# Patient Record
Sex: MALE | Race: WHITE | NOT HISPANIC OR LATINO
[De-identification: names, ages, dates, MRNs, and addresses within clinical notes are randomized per-mention and may not be internally consistent; named-entity substitution may affect disease eponyms.]

---

## 2024-04-05 PROBLEM — Z00.00 ENCOUNTER FOR PREVENTIVE HEALTH EXAMINATION: Status: ACTIVE | Noted: 2024-04-05

## 2024-04-08 ENCOUNTER — NON-APPOINTMENT (OUTPATIENT)
Age: 19
End: 2024-04-08

## 2024-04-08 ENCOUNTER — APPOINTMENT (OUTPATIENT)
Dept: OTOLARYNGOLOGY | Facility: CLINIC | Age: 19
End: 2024-04-08
Payer: COMMERCIAL

## 2024-04-08 VITALS
SYSTOLIC BLOOD PRESSURE: 132 MMHG | DIASTOLIC BLOOD PRESSURE: 78 MMHG | WEIGHT: 200 LBS | HEIGHT: 68 IN | BODY MASS INDEX: 30.31 KG/M2 | HEART RATE: 86 BPM

## 2024-04-08 DIAGNOSIS — Z78.9 OTHER SPECIFIED HEALTH STATUS: ICD-10-CM

## 2024-04-08 DIAGNOSIS — J35.1 HYPERTROPHY OF TONSILS: ICD-10-CM

## 2024-04-08 DIAGNOSIS — Z82.49 FAMILY HISTORY OF ISCHEMIC HEART DISEASE AND OTHER DISEASES OF THE CIRCULATORY SYSTEM: ICD-10-CM

## 2024-04-08 PROCEDURE — 31231 NASAL ENDOSCOPY DX: CPT

## 2024-04-08 PROCEDURE — 99204 OFFICE O/P NEW MOD 45 MIN: CPT | Mod: 25

## 2024-04-08 NOTE — REVIEW OF SYSTEMS
[Seasonal Allergies] : seasonal allergies [Dizziness] : dizziness [Vertigo] : vertigo [Recurrent Ear Infections] : recurrent ear infections [Recurrent Sinus Infections] : recurrent sinus infections [Sinus Pressure] : sinus pressure [Throat Dryness] : throat dryness [Eye Pain] : eye pain [Eyes Itch] : itching of the eyes [Negative] : Heme/Lymph [FreeTextEntry3] : watery n [FreeTextEntry1] : fatigue

## 2024-04-08 NOTE — ASSESSMENT
[FreeTextEntry1] :  Reviewed and reconciled medications, allergies, PMHx, PSHx, SocHx, FMHx.  Patient presents today stating that for the past couple months he has had repetitive ear infections and sinus infections. He states that the problem was resolved with a strong antibiotic. He states that his ears feel a little clogged.  Physical exam: -right TM: retracted -left TM: retracted -no lateralization to tuning forks -deviated septum left -mildly inflamed turbinates bilaterally L>R -class 4 tonsils -pupils equal and reactive  ENT Procedure:  Flexible nasal endoscopy -right middle meatus clear -right middle turbinate normal -some adenoid tissue -left middle and inferior turbinate swollen -deviated septum left -middle meatus narrow L>R  Audio: -denied today  Plan: -Consider a hearing test -Ordered CT scan of the sinuses for potential surgical planning -FU with results from CT scan

## 2024-04-08 NOTE — HISTORY OF PRESENT ILLNESS
[de-identified] : Patient presents today stating that for the past couple months he has had repetitive ear infections and sinus infections. He states that the problem was resolved with a strong antibiotic. He states that his ears feel a little clogged.

## 2024-04-08 NOTE — PROCEDURE
[Recalcitrant Symptoms] : recalcitrant symptoms  [FreeTextEntry6] :  Procedure: Flexible Nasal Endoscopy: Risks, benefits, and alternatives of flexible endoscopy were explained to the patient. The patient gave oral consent to proceed. The flexible scope was inserted into the right nasal cavity. Endoscopy of the inferior and middle meatus was performed. No polyp, mass, or lesion was appreciated. Olfactory cleft was clear. Spheno-ethmoid recess is clear. Nasopharynx was clear. Turbinates were without mass. The procedure was repeated on the contralateral side with similar findings. -right middle meatus clear -right middle turbinate normal -some adenoid tissue -left middle and inferior turbinate swollen -deviated septum left -middle meatus narrow L>R

## 2024-04-08 NOTE — ADDENDUM
[FreeTextEntry1] :  Documented by Kishor Manzo acting as scribe for Dr. Segovia on 04/08/2024. All Medical record entries made by the Scribe were at my, Dr. Segovia, direction and personally dictated by me on 04/08/2024 . I have reviewed the chart and agree that the record accurately reflects my personal performance of the history, physical exam, assessment and plan. I have also personally directed, reviewed, and agreed with the discharge instructions.

## 2024-04-08 NOTE — CONSULT LETTER
[Dear  ___] : Dear  [unfilled], [Consult Letter:] : I had the pleasure of evaluating your patient, [unfilled]. [Please see my note below.] : Please see my note below. [Consult Closing:] : Thank you very much for allowing me to participate in the care of this patient.  If you have any questions, please do not hesitate to contact me. [Sincerely,] : Sincerely, [FreeTextEntry3] :  Chavo Segovia MD FACS

## 2024-04-08 NOTE — PHYSICAL EXAM
[Hearing Hernandez Test (Tuning Fork On Forehead)] : no lateralization of tone [] : septum deviated to the left [Midline] : trachea located in midline position [Normal] : orientation to person, place, and time: normal [Hearing Loss Right Only] : normal [Hearing Loss Left Only] : normal [de-identified] : retracted [de-identified] : retracted [de-identified] :  mildly inflamed turbinates bilaterally L>R [de-identified] : class 4 [FreeTextEntry2] : profusely tender over the frontal, ehtmoid, and sphenoid sinuses [de-identified] : pupils equal and reactive

## 2024-04-25 ENCOUNTER — NON-APPOINTMENT (OUTPATIENT)
Age: 19
End: 2024-04-25

## 2024-04-26 ENCOUNTER — APPOINTMENT (OUTPATIENT)
Dept: OTOLARYNGOLOGY | Facility: CLINIC | Age: 19
End: 2024-04-26
Payer: COMMERCIAL

## 2024-04-26 VITALS
HEIGHT: 68 IN | SYSTOLIC BLOOD PRESSURE: 116 MMHG | HEART RATE: 70 BPM | DIASTOLIC BLOOD PRESSURE: 79 MMHG | WEIGHT: 200 LBS | BODY MASS INDEX: 30.31 KG/M2

## 2024-04-26 DIAGNOSIS — H66.006 ACUTE SUPPURATIVE OTITIS MEDIA W/OUT SPONTANEOUS RUPTURE OF EAR DRUM, RECURRENT, BILATERAL: ICD-10-CM

## 2024-04-26 DIAGNOSIS — J34.2 DEVIATED NASAL SEPTUM: ICD-10-CM

## 2024-04-26 DIAGNOSIS — J01.41 ACUTE RECURRENT PANSINUSITIS: ICD-10-CM

## 2024-04-26 DIAGNOSIS — J31.0 CHRONIC RHINITIS: ICD-10-CM

## 2024-04-26 DIAGNOSIS — H69.93 UNSPECIFIED EUSTACHIAN TUBE DISORDER, BILATERAL: ICD-10-CM

## 2024-04-26 DIAGNOSIS — J34.3 HYPERTROPHY OF NASAL TURBINATES: ICD-10-CM

## 2024-04-26 PROCEDURE — 99213 OFFICE O/P EST LOW 20 MIN: CPT

## 2024-04-26 PROCEDURE — 92557 COMPREHENSIVE HEARING TEST: CPT

## 2024-04-26 PROCEDURE — 92567 TYMPANOMETRY: CPT

## 2024-04-26 RX ORDER — AZELASTINE HYDROCHLORIDE AND FLUTICASONE PROPIONATE 137; 50 UG/1; UG/1
137-50 SPRAY, METERED NASAL
Qty: 3 | Refills: 3 | Status: ACTIVE | COMMUNITY
Start: 2024-04-26 | End: 1900-01-01

## 2024-04-26 NOTE — DATA REVIEWED
[de-identified] : Audio 4/26/24: type A tymps AU ETD AU HWNL 250-8000 Hz % understanding at 40 dB bilaterally [de-identified] : CT scan of the sinuses for potential surgical planning 4/21/24: -teeth normal -narrowed drain pathways L>R -deviated septum left -sinuses clean

## 2024-04-26 NOTE — ADDENDUM
[FreeTextEntry1] :  Documented by Kishor Manzo acting as scribe for Dr. Segovia on 04/26/2024. All Medical record entries made by the Scribe were at my, Dr. Segovia, direction and personally dictated by me on 04/26/2024 . I have reviewed the chart and agree that the record accurately reflects my personal performance of the history, physical exam, assessment and plan. I have also personally directed, reviewed, and agreed with the discharge instructions.

## 2024-04-26 NOTE — ASSESSMENT
[FreeTextEntry1] : Reviewed and reconciled medications, allergies, PMHx, PSHx, SocHx, FMHx.  Patient with h/o chronic rhinitis, nasal obstruction, otitis media, and recurrent pansinusitis presents today for a hearing test and to discuss results from CT scan. He states that his ears feel good at the moment. He denies trouble breathing through his nose. He states that he has been using sinus rinse.  CT scan of the sinuses for potential surgical planning 4/21/24: -teeth normal -narrowed drain pathways L>R -deviated septum left -sinuses clean  Audio 4/26/24: type A tymps AU ETD AU HWNL 250-8000 Hz % understanding at 40 dB bilaterally  Plan:  Audio - results interpreted by Dr. Segovia and reviewed with the patient. -Start Dymista - 1 spray bilaterally BID, spray laterally -The sinus surgery to open up drain pathways was discussed at length. Potential benefits of the procedure were discussed, though no guarantee was made nor implied. Alternatives of doing nothing versus ongoing medical therapy alone were also discussed. The risks of the procedure were individually discussed in detail as including, but not limited to bleeding, infection, loss of sense of smell, double vision, blindness, CSF leak requiring other procedures to repair, numbness of teeth/and or face, need for revision surgery, brain/skull base injury, anesthetic risks and unexpected risks.  All questions were answered. -Discussed septoplasty. R/b/a discussed. Risk of septoplasty were discussed with the patient including but not limited to bleeding, infection, septal perforation, empty nose syndrome, numbness of the front two teeth, and loss of sense of smell.  All questions were answered. -FU in 6 months

## 2024-04-26 NOTE — CONSULT LETTER
[Dear  ___] : Dear  [unfilled], [Courtesy Letter:] : I had the pleasure of seeing your patient, [unfilled], in my office today. [Please see my note below.] : Please see my note below. [Consult Closing:] : Thank you very much for allowing me to participate in the care of this patient.  If you have any questions, please do not hesitate to contact me. [Sincerely,] : Sincerely, [FreeTextEntry3] :  Chavo Segovia MD FACS

## 2024-04-26 NOTE — HISTORY OF PRESENT ILLNESS
[de-identified] : Patient with h/o chronic rhinitis, nasal obstruction, otitis media, and recurrent pansinusitis presents today for a hearing test and to discuss results from CT scan. He states that his ears feel good at the moment. He denies trouble breathing through his nose. He states that he has been using sinus rinse.

## 2024-08-19 ENCOUNTER — APPOINTMENT (OUTPATIENT)
Dept: OTOLARYNGOLOGY | Facility: CLINIC | Age: 19
End: 2024-08-19
Payer: COMMERCIAL

## 2024-08-19 VITALS
SYSTOLIC BLOOD PRESSURE: 106 MMHG | HEART RATE: 81 BPM | BODY MASS INDEX: 30.79 KG/M2 | DIASTOLIC BLOOD PRESSURE: 76 MMHG | HEIGHT: 68 IN | WEIGHT: 203.13 LBS

## 2024-08-19 DIAGNOSIS — M95.0 ACQUIRED DEFORMITY OF NOSE: ICD-10-CM

## 2024-08-19 DIAGNOSIS — J35.1 HYPERTROPHY OF TONSILS: ICD-10-CM

## 2024-08-19 DIAGNOSIS — J34.3 HYPERTROPHY OF NASAL TURBINATES: ICD-10-CM

## 2024-08-19 DIAGNOSIS — J34.2 DEVIATED NASAL SEPTUM: ICD-10-CM

## 2024-08-19 DIAGNOSIS — R04.0 EPISTAXIS: ICD-10-CM

## 2024-08-19 DIAGNOSIS — H69.93 UNSPECIFIED EUSTACHIAN TUBE DISORDER, BILATERAL: ICD-10-CM

## 2024-08-19 DIAGNOSIS — J31.0 CHRONIC RHINITIS: ICD-10-CM

## 2024-08-19 PROCEDURE — 99214 OFFICE O/P EST MOD 30 MIN: CPT

## 2024-08-19 NOTE — ADDENDUM
[FreeTextEntry1] : Documented by Nidhi Reyes acting as scribe for Dr. Segovia on 08/19/2024 All Medical record entries made by the Scribe were at my, Dr. Segovia, direction and personally dictated by me on 08/19/2024  . I have reviewed the chart and agree that the record accurately reflects my personal performance of the history, physical exam, assessment and plan. I have also personally directed, reviewed, and agreed with the discharge instructions.

## 2024-08-19 NOTE — ASSESSMENT
[FreeTextEntry1] :  Reviewed and reconciled medications, allergies, PMHx, PSHx, SocHx, FMHx   Patient with h/o chronic rhinitis, nasal obstruction, otitis media, and recurrent pansinusitis presents today stating he has been feeling a lot better. He states he has cut down on the nasal sprays because it was causing bleeding despite spraying laterally. Patient states his ears aren't clogged anymore and his breathing is better.   Physical Exam: -inflamed turbinates, left side kind of blocked, right side wide open -positive jonnathan  -mild uvula edema  -tonsils class 3  Plan: I discussed the R/B/A of Roxy for nasal valve and inferior turbinates with the patient. FU 6 months

## 2024-08-19 NOTE — PHYSICAL EXAM
[de-identified] : positive jonnathan  [de-identified] : inflamed turbinates, left side kind of blocked, right side wide open [Midline] : trachea located in midline position [de-identified] : class 3 [de-identified] : mild uvula edema  [Normal] : salivary glands are normal [FreeTextEntry2] : sensations intact. sinuses nontender to percussion

## 2024-08-19 NOTE — HISTORY OF PRESENT ILLNESS
[de-identified] : Patient with h/o chronic rhinitis, nasal obstruction, otitis media, and recurrent pansinusitis presents today stating he has been feeling a lot better. He states he has cut down on the nasal sprays because it was causing bleeding despite spraying laterally. Patient states his ears aren't clogged anymore and his breathing is better.